# Patient Record
Sex: MALE | Race: WHITE | NOT HISPANIC OR LATINO | Employment: FULL TIME | URBAN - METROPOLITAN AREA
[De-identification: names, ages, dates, MRNs, and addresses within clinical notes are randomized per-mention and may not be internally consistent; named-entity substitution may affect disease eponyms.]

---

## 2023-09-08 LAB
ALANINE AMINOTRANSFERASE (SGPT) (U/L) IN SER/PLAS: 18 U/L (ref 10–52)
ALBUMIN (G/DL) IN SER/PLAS: 5.4 G/DL (ref 3.4–5)
ALKALINE PHOSPHATASE (U/L) IN SER/PLAS: 77 U/L (ref 33–120)
ANION GAP IN SER/PLAS: 14 MMOL/L (ref 10–20)
APPEARANCE, URINE: CLEAR
ASPARTATE AMINOTRANSFERASE (SGOT) (U/L) IN SER/PLAS: 25 U/L (ref 9–39)
BASOPHILS (10*3/UL) IN BLOOD BY AUTOMATED COUNT: 0.04 X10E9/L (ref 0–0.1)
BASOPHILS/100 LEUKOCYTES IN BLOOD BY AUTOMATED COUNT: 0.9 % (ref 0–2)
BILIRUBIN TOTAL (MG/DL) IN SER/PLAS: 1 MG/DL (ref 0–1.2)
BILIRUBIN, URINE: NEGATIVE
BLOOD, URINE: NEGATIVE
CALCIDIOL (25 OH VITAMIN D3) (NG/ML) IN SER/PLAS: 52 NG/ML
CALCIUM (MG/DL) IN SER/PLAS: 10.5 MG/DL (ref 8.6–10.6)
CARBON DIOXIDE, TOTAL (MMOL/L) IN SER/PLAS: 29 MMOL/L (ref 21–32)
CHLORIDE (MMOL/L) IN SER/PLAS: 103 MMOL/L (ref 98–107)
COLOR, URINE: NORMAL
CREATININE (MG/DL) IN SER/PLAS: 0.82 MG/DL (ref 0.5–1.3)
EOSINOPHILS (10*3/UL) IN BLOOD BY AUTOMATED COUNT: 0.06 X10E9/L (ref 0–0.7)
EOSINOPHILS/100 LEUKOCYTES IN BLOOD BY AUTOMATED COUNT: 1.3 % (ref 0–6)
ERYTHROCYTE DISTRIBUTION WIDTH (RATIO) BY AUTOMATED COUNT: 12.2 % (ref 11.5–14.5)
ERYTHROCYTE MEAN CORPUSCULAR HEMOGLOBIN CONCENTRATION (G/DL) BY AUTOMATED: 34 G/DL (ref 32–36)
ERYTHROCYTE MEAN CORPUSCULAR VOLUME (FL) BY AUTOMATED COUNT: 92 FL (ref 80–100)
ERYTHROCYTES (10*6/UL) IN BLOOD BY AUTOMATED COUNT: 5.2 X10E12/L (ref 4.5–5.9)
FERRITIN (UG/LL) IN SER/PLAS: 71 UG/L (ref 20–300)
GFR MALE: >90 ML/MIN/1.73M2
GLUCOSE (MG/DL) IN SER/PLAS: 82 MG/DL (ref 74–99)
GLUCOSE, URINE: NEGATIVE MG/DL
HEMATOCRIT (%) IN BLOOD BY AUTOMATED COUNT: 47.9 % (ref 41–52)
HEMOGLOBIN (G/DL) IN BLOOD: 16.3 G/DL (ref 13.5–17.5)
IMMATURE GRANULOCYTES/100 LEUKOCYTES IN BLOOD BY AUTOMATED COUNT: 0.2 % (ref 0–0.9)
IRON (UG/DL) IN SER/PLAS: 127 UG/DL (ref 35–150)
IRON BINDING CAPACITY (UG/DL) IN SER/PLAS: 396 UG/DL (ref 240–445)
IRON SATURATION (%) IN SER/PLAS: 32 % (ref 25–45)
KETONES, URINE: NEGATIVE MG/DL
LEUKOCYTE ESTERASE, URINE: NEGATIVE
LEUKOCYTES (10*3/UL) IN BLOOD BY AUTOMATED COUNT: 4.7 X10E9/L (ref 4.4–11.3)
LYMPHOCYTES (10*3/UL) IN BLOOD BY AUTOMATED COUNT: 1.83 X10E9/L (ref 1.2–4.8)
LYMPHOCYTES/100 LEUKOCYTES IN BLOOD BY AUTOMATED COUNT: 39 % (ref 13–44)
MONOCYTES (10*3/UL) IN BLOOD BY AUTOMATED COUNT: 0.41 X10E9/L (ref 0.1–1)
MONOCYTES/100 LEUKOCYTES IN BLOOD BY AUTOMATED COUNT: 8.7 % (ref 2–10)
NEUTROPHILS (10*3/UL) IN BLOOD BY AUTOMATED COUNT: 2.34 X10E9/L (ref 1.2–7.7)
NEUTROPHILS/100 LEUKOCYTES IN BLOOD BY AUTOMATED COUNT: 49.9 % (ref 40–80)
NITRITE, URINE: NEGATIVE
NRBC (PER 100 WBCS) BY AUTOMATED COUNT: 0 /100 WBC (ref 0–0)
PH, URINE: 8 (ref 5–8)
PLATELETS (10*3/UL) IN BLOOD AUTOMATED COUNT: 132 X10E9/L (ref 150–450)
POTASSIUM (MMOL/L) IN SER/PLAS: 4.5 MMOL/L (ref 3.5–5.3)
PROTEIN TOTAL: 7.8 G/DL (ref 6.4–8.2)
PROTEIN, URINE: NEGATIVE MG/DL
SODIUM (MMOL/L) IN SER/PLAS: 141 MMOL/L (ref 136–145)
SPECIFIC GRAVITY, URINE: 1.01 (ref 1–1.03)
THYROTROPIN (MIU/L) IN SER/PLAS BY DETECTION LIMIT <= 0.05 MIU/L: 1.77 MIU/L (ref 0.44–3.98)
UREA NITROGEN (MG/DL) IN SER/PLAS: 13 MG/DL (ref 6–23)
UROBILINOGEN, URINE: <2 MG/DL (ref 0–1.9)

## 2023-09-12 LAB — OSTEOCALCIN: 30 NG/ML (ref 8–36)

## 2023-09-14 LAB — N-TELOPEPTIDE,SERUM: 14.9 NM BCE (ref 5.4–24.2)

## 2024-03-15 ENCOUNTER — TELEPHONE (OUTPATIENT)
Dept: ORTHOPEDIC SURGERY | Facility: HOSPITAL | Age: 24
End: 2024-03-15
Payer: COMMERCIAL

## 2024-04-01 DIAGNOSIS — F43.0 STRESS REACTION: ICD-10-CM

## 2024-04-01 DIAGNOSIS — M84.369A STRESS FRACTURE OF TIBIA DUE TO MULTIPLE OR REPETITIVE STRESS, INITIAL ENCOUNTER: Primary | ICD-10-CM

## 2024-04-01 NOTE — TELEPHONE ENCOUNTER
States he is going to get the compartment testing done at school in [place] with Dr. Shamir Reed, it can be faxed to [phone]. Will have this sent over today.

## 2024-04-10 ENCOUNTER — TELEPHONE (OUTPATIENT)
Dept: ORTHOPEDIC SURGERY | Facility: CLINIC | Age: 24
End: 2024-04-10
Payer: COMMERCIAL

## 2024-04-12 ENCOUNTER — APPOINTMENT (OUTPATIENT)
Dept: ORTHOPEDIC SURGERY | Facility: HOSPITAL | Age: 24
End: 2024-04-12

## 2024-04-12 NOTE — TELEPHONE ENCOUNTER
Spoke with patient, discussed that Dr. Mccoy still wants the testing to be completed regardless. We can have him come meet with one of our other doctor's before his appt with Dr. Mccoy to complete. This would likely be on 4/25 @ [facility], I will have to confirm with that Dr's office when would be the best time to add to their schedule, I will call when that has been established.     Pt. Called back shortly after to state it would be best for him to have the testing done at school. He asked if anyone from our office could speak with Dr. Reed to agree to do the testing. I stated that he would need to provide their office information to us and that there is no guarantee that he will even change his mind and agree to do it. It would be easier for him to complete at  as it will be guaranteed. He still would like to have done at school. He can leave the contact information in [facility] [employer] or leave me a voicemail.

## 2024-04-26 ENCOUNTER — APPOINTMENT (OUTPATIENT)
Dept: ORTHOPEDIC SURGERY | Facility: HOSPITAL | Age: 24
End: 2024-04-26

## 2024-05-07 ENCOUNTER — TELEPHONE (OUTPATIENT)
Dept: ORTHOPEDIC SURGERY | Facility: HOSPITAL | Age: 24
End: 2024-05-07
Payer: COMMERCIAL

## 2024-05-09 ENCOUNTER — PROCEDURE VISIT (OUTPATIENT)
Dept: SPORTS MEDICINE | Facility: HOSPITAL | Age: 24
End: 2024-05-09
Payer: COMMERCIAL

## 2024-05-09 DIAGNOSIS — R52 PAIN AGGRAVATED BY EXERCISE: Primary | ICD-10-CM

## 2024-05-09 DIAGNOSIS — M79.605 LEG PAIN, BILATERAL: ICD-10-CM

## 2024-05-09 DIAGNOSIS — M79.604 LEG PAIN, BILATERAL: ICD-10-CM

## 2024-05-09 PROCEDURE — 99213 OFFICE O/P EST LOW 20 MIN: CPT | Performed by: PEDIATRICS

## 2024-05-09 PROCEDURE — 20950 MNTR INTRSTITIAL FLUID PRESS: CPT | Performed by: PEDIATRICS

## 2024-05-09 NOTE — PROGRESS NOTES
Chief Complaint   Patient presents with    Left Lower Leg - Pain    Right Lower Leg - Pain       Consulting physician: Levar Conroy MD    A report with my findings and recommendations will be sent to the primary and referring physician via written or electronic means when information is available    History of Present Illness:  Guero Avila is a 23 y.o. male runner who presented on 05/09/2024 with bilateral leg pain since the Spring of his freshman year at college.  He has had PT, VGA without relief.  No numbness.  Feel tight.  Ok in ADLs.  MRI neg for stress fx.  Labs incl ferritin, vit D, CBC normal    On/off pain this track season.  Good and bad days.  Some days better as he warms up, then can hurt after.  Occas walking down stairs.  Doesn't think that it affects performance.  Can continue to run.  Hesitant to train as hard as her could. Times are not as good.    After school wants to run for fitness, plans to do marathons. No sponsors or professional goal. 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      Has custom shoe inserts.     Track 25-30 mp week  Wants to be aroung 52  Has been as high as 62 - 8k races during the season.      Past [facility] HX:  Specialty Problems    None       ROS  12 point ROS reviewed and is negative except for items listed   none    Social Hx:  Home:  parents, has older sister living in [place]  Sports: XC and track  School:  [employer]   Grade 0615-1503: going into Sr year    Medications:   No current outpatient medications on file prior to visit.     No current facility-administered medications on file prior to visit.         Allergies:  Not on File     Physical Exam:    Visit Vitals  Smoking Status Never      General appearance: Well-appearing well-nourished  Psych: Normal mood and affect    Neuro: Normal sensation to light touch throughout the involved extremities  Vascular: No extremity edema or discoloration.  Skin: negative.  Lymphatic: no regional lymphadenopathy present.  Eyes: no  conjunctival injection.  BILATERAL   Lower Leg / Ankle / Foot Exam    Inspection:   Pes planus: None  Pes cavus: None  Deformity: None  Soft tissue swelling: None  Erythema: None  Ecchymosis: None  Calf atrophy: None    Range of motion:  Inversion (20-35) full, pain free  Eversion (5-25) full, pain free  Dorsiflexion (20-30) full, pain free  Plantarflexion (40-50) full, pain free  Adduction foot full, pain free  Abduction foot full, pain free    Palpation:  TTP ATFL No  TTP CFL No  TTP Deltoid ligament No  TTP Syndesmosis No  TTP Anterior joint line No  TTP Medial malleolus No  TTP Lateral malleolus No  TTP Tibia + bilat distal 1/3   TTP Fibula No  TTP Talus No  TTP Calcaneus No  TTP Base of the fifth metatarsal No  TTP Navicular No  TTP Cuboid No  TTP Cuneiforms No  TTP Metatarsals No  TTP Phalanges No    TTP Lis franc joint No  TTP MTP joints No  TTP IP joints No    TTP Achilles No  TTP Peroneal tendon No  TTP Posterior tibialis No  TTP Anterior tibialis No  TTP Extensor hallucis No  TTP Extensor tendons No  TTP Flexor hallucis longus No  TTP Sinus tarsi No  TTP Plantar fascia No    Strength:  Dorsiflexion no pain, 5/5  Plantarflexion no pain, 5/5  Inversion no pain, 5/5  Eversion  no pain, 5/5  Flexion MTP joints no pain, 5/5  Extension MTP joints no pain, 5/5  Flexion IP joints no pain, 5/5  Extension IP joints no pain, 5/5    Hip flexion no pain, 5/5  Hip extension no pain, 5/5  Hip abduction no pain, 5/5  Hip adduction no pain, 5/5  Hamstring no pain, 5/5  Quadriceps no pain, 5/5    Ligament Tests:  Anterior drawer: negative  Talar tilt: negative  Foot external rotation test: negative  Tibia-fibula squeeze test: negative    Flexibility:   dorsiflexes to 10 past neutral      Functional Exam:  Proprioception: good    Hop test: no pain  Hop test: no loss of jump height  Trendelenburg:-  SL squats: valgus: no    Gait non-antalgic     No change in strength after treadmill testing    We discussed the risks and  benefits of exertional comp press testing including pain, nerve damage, and infection.    Monitoring of interstitial fluid pressure with insertion needle manometer technique was performed  in detection of muscle compartment syndrome/pressures in the anterior, lateral and posterior compartments of the bilateral legs both before and after exercise.   The procedure was performed using sterile technique. The patient tolerated the procedure well.Results are as follows:    RIGHT  Pre testing:  Lateral: 27  Anterior compartment: 32  Posterior superficial: 32  Posterior deep: 31      Post testing:  Lateral: 22  Anterior compartment: 19  Posterior superficial: 13  Posterior deep: 16    LEFT  Pre testing:  Lateral: 22  Anterior compartment: 19  Posterior superficial: 30  Posterior deep: 31      Post testing:  Lateral: 22  Anterior compartment: 16  Posterior superficial: 17  Posterior deep: 22      Treadmill time: total 30 min - got off to perform jumps and sprints to try to increase sx     Monitoring of interstitial fluid pressure with insertion needle manometer technique was performed  in detection of muscle compartment syndrome/pressures in the anterior, lateral and posterior compartments of the bilateral legs both before and after exercise. 3 compartments R lower extremity tested before exercise and 3 after exercise.  3 compartments left lower extremity tested pre exercise and 3 after exercise         Imaging:  MRI bilat leg 9/7/23 - no stress fx        Imaging was personally interpreted and reviewed with the patient and/or family    Impression and Plan:  Guero Avila is a 23 y.o. male runner  who presented on 05/09/2024  with exercise induced leg pain    Objective: on physical exam he had TTP distal 1/3 of the tibia bilaterally, good strength, forefoot/mid foot strike running on streadmill    Plan: will discuss next steps with Dr. Mccoy.  I had a conversation with Dr. Mccoy - he agrees that the testing is not consistent  with [facility].  Will pursue further therapy to address mechanical issues.  Has already had VGA.  During treatmill testing he had mid foot strike.  Long strides (but is quite tall).  No valgus or hip drop.  Was richard to tolerate treadmill work out and jumping.  Was not able to reproduce his worst sx.            ** Please excuse any errors in grammar or translation related to this dictation. Voice recognition software was utilized to prepare this document. **

## 2024-05-09 NOTE — LETTER
May 13, 2024     Levar Conroy MD  [address]  [address]  [facility] [address]    Patient: Guero Avila   YOB: 2000   Date of Visit: 5/9/2024       Dear Dr. Levar Conroy MD:    Thank you for referring Guero Avila to me for evaluation. Below are my notes for this consultation.  If you have questions, please do not hesitate to call me. I look forward to following your patient along with you.       Sincerely,     Brittany Hill MD      CC: No Recipients  ______________________________________________________________________________________    Chief Complaint   Patient presents with   • Left Lower Leg - Pain   • Right Lower Leg - Pain       Consulting physician: Levar Conroy MD    A report with my findings and recommendations will be sent to the primary and referring physician via written or electronic means when information is available    History of Present Illness:  Guero Avila is a 23 y.o. male runner who presented on 05/09/2024 with bilateral leg pain since the Spring of his freshman year at college.  He has had PT, VGA without relief.  No numbness.  Feel tight.  Ok in ADLs.  MRI neg for stress fx.  Labs incl ferritin, vit D, CBC normal    On/off pain this track season.  Good and bad days.  Some days better as he warms up, then can hurt after.  Occas walking down stairs.  Doesn't think that it affects performance.  Can continue to run.  Hesitant to train as hard as her could. Times are not as good.    After school wants to run for fitness, plans to do marathons. No sponsors or professional goal. 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      Has custom shoe inserts.     Track 25-30 mp week  Wants to be aroung 52  Has been as high as 62 - 8k races during the season.      Past [facility] HX:  Specialty Problems    None       ROS  12 point ROS reviewed and is negative except for items listed   none    Social Hx:  Home:  parents, has older sister living in [place]  Sports: XC and  track  School:  [employer]   Grade 7854-3472: going into Sr year    Medications:   No current outpatient medications on file prior to visit.     No current facility-administered medications on file prior to visit.         Allergies:  Not on File     Physical Exam:    Visit Vitals  Smoking Status Never      General appearance: Well-appearing well-nourished  Psych: Normal mood and affect    Neuro: Normal sensation to light touch throughout the involved extremities  Vascular: No extremity edema or discoloration.  Skin: negative.  Lymphatic: no regional lymphadenopathy present.  Eyes: no conjunctival injection.  BILATERAL   Lower Leg / Ankle / Foot Exam    Inspection:   Pes planus: None  Pes cavus: None  Deformity: None  Soft tissue swelling: None  Erythema: None  Ecchymosis: None  Calf atrophy: None    Range of motion:  Inversion (20-35) full, pain free  Eversion (5-25) full, pain free  Dorsiflexion (20-30) full, pain free  Plantarflexion (40-50) full, pain free  Adduction foot full, pain free  Abduction foot full, pain free    Palpation:  TTP ATFL No  TTP CFL No  TTP Deltoid ligament No  TTP Syndesmosis No  TTP Anterior joint line No  TTP Medial malleolus No  TTP Lateral malleolus No  TTP Tibia + bilat distal 1/3   TTP Fibula No  TTP Talus No  TTP Calcaneus No  TTP Base of the fifth metatarsal No  TTP Navicular No  TTP Cuboid No  TTP Cuneiforms No  TTP Metatarsals No  TTP Phalanges No    TTP Lis franc joint No  TTP MTP joints No  TTP IP joints No    TTP Achilles No  TTP Peroneal tendon No  TTP Posterior tibialis No  TTP Anterior tibialis No  TTP Extensor hallucis No  TTP Extensor tendons No  TTP Flexor hallucis longus No  TTP Sinus tarsi No  TTP Plantar fascia No    Strength:  Dorsiflexion no pain, 5/5  Plantarflexion no pain, 5/5  Inversion no pain, 5/5  Eversion  no pain, 5/5  Flexion MTP joints no pain, 5/5  Extension MTP joints no pain, 5/5  Flexion IP joints no pain, 5/5  Extension IP joints no pain, 5/5    Hip  flexion no pain, 5/5  Hip extension no pain, 5/5  Hip abduction no pain, 5/5  Hip adduction no pain, 5/5  Hamstring no pain, 5/5  Quadriceps no pain, 5/5    Ligament Tests:  Anterior drawer: negative  Talar tilt: negative  Foot external rotation test: negative  Tibia-fibula squeeze test: negative    Flexibility:   dorsiflexes to 10 past neutral      Functional Exam:  Proprioception: good    Hop test: no pain  Hop test: no loss of jump height  Trendelenburg:-  SL squats: valgus: no    Gait non-antalgic     No change in strength after treadmill testing    We discussed the risks and benefits of exertional comp press testing including pain, nerve damage, and infection.    Monitoring of interstitial fluid pressure with insertion needle manometer technique was performed  in detection of muscle compartment syndrome/pressures in the anterior, lateral and posterior compartments of the bilateral legs both before and after exercise.   The procedure was performed using sterile technique. The patient tolerated the procedure well.Results are as follows:    RIGHT  Pre testing:  Lateral: 27  Anterior compartment: 32  Posterior superficial: 32  Posterior deep: 31      Post testing:  Lateral: 22  Anterior compartment: 19  Posterior superficial: 13  Posterior deep: 16    LEFT  Pre testing:  Lateral: 22  Anterior compartment: 19  Posterior superficial: 30  Posterior deep: 31      Post testing:  Lateral: 22  Anterior compartment: 16  Posterior superficial: 17  Posterior deep: 22      Treadmill time: total 30 min - got off to perform jumps and sprints to try to increase sx     Monitoring of interstitial fluid pressure with insertion needle manometer technique was performed  in detection of muscle compartment syndrome/pressures in the anterior, lateral and posterior compartments of the bilateral legs both before and after exercise. 3 compartments R lower extremity tested before exercise and 3 after exercise.  3 compartments left lower  extremity tested pre exercise and 3 after exercise         Imaging:  MRI bilat leg 9/7/23 - no stress fx        Imaging was personally interpreted and reviewed with the patient and/or family    Impression and Plan:  Guero Avila is a 23 y.o. male runner  who presented on 05/09/2024  with exercise induced leg pain    Objective: on physical exam he had TTP distal 1/3 of the tibia bilaterally, good strength, forefoot/mid foot strike running on streadmill    Plan: will discuss next steps with Dr. Mccoy.  I had a conversation with Dr. Mccoy - he agrees that the testing is not consistent with [facility]ECS.  Will pursue further therapy to address mechanical issues.  Has already had VGA.  During treatmill testing he had mid foot strike.  Long strides (but is quite tall).  No valgus or hip drop.  Was richard to tolerate treadmill work out and jumping.  Was not able to reproduce his worst sx.            ** Please excuse any errors in grammar or translation related to this dictation. Voice recognition software was utilized to prepare this document. **

## 2024-05-10 ENCOUNTER — OFFICE VISIT (OUTPATIENT)
Dept: ORTHOPEDIC SURGERY | Facility: HOSPITAL | Age: 24
End: 2024-05-10
Payer: COMMERCIAL

## 2024-05-10 DIAGNOSIS — M84.369A STRESS FRACTURE OF TIBIA DUE TO MULTIPLE OR REPETITIVE STRESS, INITIAL ENCOUNTER: ICD-10-CM

## 2024-05-10 DIAGNOSIS — F43.0 STRESS REACTION: ICD-10-CM

## 2024-05-10 PROCEDURE — 99213 OFFICE O/P EST LOW 20 MIN: CPT | Performed by: ORTHOPAEDIC SURGERY

## 2024-05-10 PROCEDURE — 1036F TOBACCO NON-USER: CPT | Performed by: ORTHOPAEDIC SURGERY

## 2024-05-10 NOTE — PROGRESS NOTES
PRIMARY CARE PHYSICIAN: Levar Conroy MD  REFERRING PROVIDER: Anshul Mccoy MD  [address]  Department [place] [address]    Established Patient Evaluation      SUBJECTIVE  CHIEF COMPLAINT:   Chief Complaint   Patient presents with    Left Lower Leg - Pain    Right Lower Leg - Pain        HPI: Guero Avila is a 23 y.o. patient presenting for an established patient evaluation. Patient is here with his father today.    Overall, the patient continues to endorse bilateral lower leg pain but was able to participate in track. He was not symptom free and did have to miss some meets, but overall was able to run.    The patient also did complete exertional compartment testing and is here to follow up on the results.    Patient is currently a emiliano in college. He was able to complete his track season but was not symptom free. Especially when trying to further increase his mileage, the patient notes increased symptoms.    REVIEW OF SYSTEMS  Constitutional: See HPI for pain assessment, No significant weight loss, recent trauma  Cardiovascular: No chest pain, shortness of breath  Respiratory: No difficulty breathing, cough  Gastrointestinal: No nausea, vomiting, diarrhea, constipation  Musculoskeletal: Noted in HPI, positive for pain, restricted motion, stiffness  Integumentary: No rashes, easy bruising, redness   Neurological: no numbness or tingling in extremities, no gait disturbances   Psychiatric: No mood changes, memory changes, social issues  Heme/Lymph: no excessive swelling, easy bruising, excessive bleeding  ENT: No hearing changes  Eyes: No vision changes    History reviewed. No pertinent past medical history.     Not on File     History reviewed. No pertinent surgical history.     No family history on file.     Social History     Socioeconomic History    Marital status: Single     Spouse name: Not on file    Number of children: Not on file    Years of education: Not on file    Highest  "education level: Not on file   Occupational History    Not on file   Tobacco Use    Smoking status: Never    Smokeless tobacco: Never   Substance and Sexual Activity    Alcohol use: Not on file    Drug use: Not on file    Sexual activity: Not on file   Other Topics Concern    Not on file   Social History Narrative    Not on file     Social Determinants of Health     Financial Resource Strain: Not on file   Food Insecurity: Not on file   Transportation Needs: Not on file   Physical Activity: Not on file   Stress: Not on file   Social Connections: Not on file   Intimate Partner Violence: Not on file   Housing Stability: Not on file        CURRENT MEDICATIONS:   No current outpatient medications on file.     No current facility-administered medications for this visit.        OBJECTIVE  PHYSICAL EXAM      8/6/2020     2:27 PM 7/28/2021     1:58 PM   Vitals   Systolic 112 116   Diastolic 72 68   Heart Rate 51 48   Height (in) 1.918 m (6' 3.5\") 1.911 m (6' 3.25\")   Weight (lb) 168 171   BMI 20.72 kg/m2 21.23 kg/m2   BSA (m2) 2.01 m2 2.03 m2      There is no height or weight on file to calculate BMI.      23 y.o. year-old in no acute distress. Well nourished. Normal affect. Alert and oriented x 3.     Gait: Normal Tandem. Neutral alignment. Able to perform single leg stance. No abnormalities of balance or coordination.  Skin: Intact over the bilateral upper and lower extremities. No erythema, ecchymosis, or temperature changes.    Right Knee:  ROM: 0-140 degrees. Negative crepitus.  No effusion.   Negative Joint Line Tenderness. Good quadriceps contraction. Intact extensor mechanism.  No tenderness to palpation along the tibialis anterior and along the tibia itself. No tenderness to palpation posteriorly along the gastroc.    Left Knee:  ROM: 0-140 degrees. Negative crepitus.  No effusion.   Negative Joint Line Tenderness. Good quadriceps contraction. Intact extensor mechanism.  No tenderness to palpation along the " tibialis anterior and along the tibia itself. No tenderness to palpation posteriorly along the gastroc.    Motor Strength: 5 out of 5 in the bilateral lower extremities.  Neuro: L4-S1 sensation intact grossly bilaterally.  Vascular: 2+ DP/PT pulses bilaterally. Bilateral lower extremity compartments supple.      Compartment testing was reviewed with the patient. Patient compartment pressures slightly elevated during running, but were not overly concerning.    ASSESSMENT & PLAN    Impression: 23 y.o. male with bilateral lower leg pain.    Plan:  The patient has done a great job working on flexibility and participating in track this past season. While he continues to have symptoms, they have slowly improved over time.    The patient is attempting to increase his weekly mileage, and I agree this is a reasonable next step. He can work on gradually building the mileage up and working on softer surfaces as he is able to.    Given the exertional compartment testing results, I am hesitant to jump into a fasciotomy at todays visit. The treatment would require surgical fasciotomy, and would require a 3 month recovery period.    Follow-Up: Patient will follow-up as they feel necessary.    At the end of the visit, all questions were answered in full. The patient is in agreement with the plan and recommendations. They will call the office with any questions/concerns.      Note dictated with GTxcel software. Completed without full typed error editing and sent to avoid delay.

## 2024-05-13 ENCOUNTER — APPOINTMENT (OUTPATIENT)
Dept: ORTHOPEDIC SURGERY | Facility: HOSPITAL | Age: 24
End: 2024-05-13

## 2024-05-13 ENCOUNTER — APPOINTMENT (OUTPATIENT)
Dept: SPORTS MEDICINE | Facility: HOSPITAL | Age: 24
End: 2024-05-13